# Patient Record
Sex: FEMALE | Race: BLACK OR AFRICAN AMERICAN
[De-identification: names, ages, dates, MRNs, and addresses within clinical notes are randomized per-mention and may not be internally consistent; named-entity substitution may affect disease eponyms.]

---

## 2018-02-27 ENCOUNTER — HOSPITAL ENCOUNTER (EMERGENCY)
Dept: HOSPITAL 88 - ER | Age: 48
Discharge: HOME | End: 2018-02-27
Payer: COMMERCIAL

## 2018-02-27 VITALS — DIASTOLIC BLOOD PRESSURE: 74 MMHG | SYSTOLIC BLOOD PRESSURE: 134 MMHG

## 2018-02-27 DIAGNOSIS — J45.909: ICD-10-CM

## 2018-02-27 DIAGNOSIS — M79.605: Primary | ICD-10-CM

## 2018-02-27 DIAGNOSIS — M79.604: ICD-10-CM

## 2018-02-27 DIAGNOSIS — M79.89: ICD-10-CM

## 2018-02-27 PROCEDURE — 99283 EMERGENCY DEPT VISIT LOW MDM: CPT

## 2018-02-27 PROCEDURE — 93970 EXTREMITY STUDY: CPT

## 2018-02-27 NOTE — XMS REPORT
Patient Summary Document

 Created on: 2018



MAUREEN FELDMAN

External Reference #: [id number]

: 1970

Sex: Female



Demographics







 Address  [address] [place] [address] 

[facility] [place]  [address]

 

 Home Phone  [phone]

 

 Preferred Language  Unknown

 

 Marital Status  Unknown

 

 Zoroastrianism Affiliation  Unknown

 

 Race  Unknown

 

 Additional Race(s)  

 

 Ethnic Group  Unknown





Author







 Author  [facility]

 

 Address  Unknown

 

 Phone  Unavailable







Care Team Providers







 Care Team Member Name  Role  Phone

 

 GENE HOLCOMB  Unavailable  Unavailable







Problems

This patient has no known problems.



Allergies, Adverse Reactions, Alerts

This patient has no known allergies or adverse reactions.



Medications

This patient has no known medications.



Results







 Test Description  Test Time  Test Comments  Text Results  Atomic Results  
Result Comments









 MAMMOGRAPHY DIGITAL SCR BILAT            [place] [address]      Patient Name: 
MAUREEN FELDMAN   MR #: [id number]    : 1970 Age/Sex: 47/F  Acct #: 
[id number] Req #: [id number]  [facility] Physician:     Ordered by: ZHANG CANAS, 
GEEN SANDOVAL MD  Report #: [id number]   Location: MAMMO  Room/Bed:     _____________
________________________________________________________________________________
______    Procedure: [id number] MG/MAMMOGRAPHY DIGITAL SCR BILAT  Exam Date:                             Exam Time: 1028       REPORT STATUS: Signed   
    #[id number] - [id number]   #BILATERAL DIGITAL SCREENING MAMMOGRAM WITH CAD
: 2017   CLINICAL: Routine screening.        Comparison is made to exams 
dated:  10/24/2016 mammogram, 2015 mammogram and 10/28/2014    mammogram 
- [facility].  Current study contains 8 films.     The 
tissue of both breasts is predominantly fatty.     Current study was also 
evaluated with a Computer Aided Detection (CAD) system.     There are benign 
calcifications in the right breast.  There also is a biopsy clip in the left    
breast.     No significant masses, calcifications, or other findings are seen 
in either breast.     There has been no significant interval change.      
IMPRESSION: BENIGN   There is no mammographic evidence of malignancy.  A 1 year 
screening mammogram is recommended.    The patient will be notified by letter 
of the results.           Dottie lea/alhaji:2017 14:45:17        Imaging Technologist: Amy LEACH)(JOLEEN), [facility]   letter sent: Compared to Prior B9     Mammogram BI-
RADS: 2 Benign     Dictated By: DOTTIE BOYD DO  Electronically Signed By: 
DOTTIE BOYD DO on 17 1445  Transcribed By: ALHAJI on 17 1445     
  COPY TO:   GENE HOLCOMB           

 

 KNEE LEFT THREE VIEWS            [place] [address]      Patient Name: MAUREEN FELDMAN   MR #: [id number]    : 1970 Age/Sex: 47/F  Acct #: 
[id number] Req #: [id number]  Adm Physician:     Ordered by: ZHANG CANAS, 
GENE SANDOVAL MD  Report #: [id number]   Location: [facility]  Room/Bed:     _______________
________________________________________________________________________________
____    Procedure: [id number] DX/KNEE LEFT THREE VIEWS  Exam Date: 17     
                       Exam Time: 1808       REPORT STATUS: Signed    PROCEDURE
:   X-RAY LEFT KNEE, THREE OR MORE VIEWS       COMPARISON:   None.       
INDICATIONS:   KNEE PAIN       FINDINGS:      The bones are well-mineralized. 
There are no acute, displaced    fractures, subluxations, lytic or blastic 
lesions.    Minimal degenerative joint disease with presence of small marginal 
   osteophytes in the patella and medial distal femoral margin. No joint    
space narrowing.   There is no evidence of a joint effusion.        CONCLUSION:
      No acute abnormalities.   Minimal degenerative joint disease with 
presence of small marginal    osteophytes.        Rosalinda Arellano M.D.     
Dictated by:  Rosalinda Arellano M.D. on 2017 at 19:09        
Electronically approved by:  Rosalinda Arellano M.D. on 2017 at    19:09  
              Dictated By: ROSALINDA ARELLANO MD  Electronically Signed By: ROSALINDA ARELLANO MD on 17  Transcribed By: AUGUSTO on 17       COPY 
TO:   GENE HOLCOMB

## 2021-04-13 ENCOUNTER — HOSPITAL ENCOUNTER (EMERGENCY)
Dept: HOSPITAL 88 - ER | Age: 51
Discharge: HOME | End: 2021-04-13
Payer: COMMERCIAL

## 2021-04-13 VITALS — HEIGHT: 63 IN | BODY MASS INDEX: 51.91 KG/M2 | WEIGHT: 293 LBS

## 2021-04-13 VITALS — SYSTOLIC BLOOD PRESSURE: 112 MMHG | DIASTOLIC BLOOD PRESSURE: 86 MMHG

## 2021-04-13 DIAGNOSIS — E66.01: ICD-10-CM

## 2021-04-13 DIAGNOSIS — R06.02: ICD-10-CM

## 2021-04-13 DIAGNOSIS — U07.1: Primary | ICD-10-CM

## 2021-04-13 PROCEDURE — 93970 EXTREMITY STUDY: CPT

## 2021-04-13 PROCEDURE — 93005 ELECTROCARDIOGRAM TRACING: CPT

## 2021-04-13 PROCEDURE — 71045 X-RAY EXAM CHEST 1 VIEW: CPT

## 2021-04-13 PROCEDURE — 99284 EMERGENCY DEPT VISIT MOD MDM: CPT

## 2021-05-19 ENCOUNTER — HOSPITAL ENCOUNTER (OUTPATIENT)
Dept: HOSPITAL 88 - RAD | Age: 51
End: 2021-05-19
Payer: COMMERCIAL

## 2021-05-19 DIAGNOSIS — J12.82: Primary | ICD-10-CM

## 2021-05-19 PROCEDURE — 71046 X-RAY EXAM CHEST 2 VIEWS: CPT

## 2021-05-28 ENCOUNTER — HOSPITAL ENCOUNTER (OUTPATIENT)
Dept: HOSPITAL 88 - RAD | Age: 51
End: 2021-05-28
Attending: FAMILY MEDICINE
Payer: COMMERCIAL

## 2021-05-28 DIAGNOSIS — M79.661: Primary | ICD-10-CM

## 2021-05-28 DIAGNOSIS — M79.662: ICD-10-CM

## 2021-05-28 PROCEDURE — 93970 EXTREMITY STUDY: CPT
